# Patient Record
(demographics unavailable — no encounter records)

---

## 2024-10-25 NOTE — PHYSICAL EXAM
[Chaperone Declined] : Patient declined chaperone [[id number]] : A chaperone was present during the pelvic exam. [Appropriately responsive] : appropriately responsive [Alert] : alert [No Acute Distress] : no acute distress [Soft] : soft [Non-tender] : non-tender [Non-distended] : non-distended [Oriented x3] : oriented x3 [Examination Of The Breasts] : a normal appearance [No Masses] : no breast masses were palpable [Labia Majora] : normal [Labia Minora] : normal [Normal] : normal [Uterine Adnexae] : normal

## 2024-10-25 NOTE — HISTORY OF PRESENT ILLNESS
[Normal Amount/Duration] :  normal amount and duration [Frequency: Q ___ days] : menstrual periods occur approximately every [unfilled] days [Never active] : never active [TextBox_31] : never [FreeTextEntry1] : 10/11/24

## 2024-10-25 NOTE — PLAN
[FreeTextEntry1] : 28 y/o G0 LMP 10/11/24 presents to establish care  HCM f/u pap  Urinary symptoms f/u Ua/Ucx  Patient screened for depression- no signs of clinical depression. PHQ-9 scores reviewed over the course of the visit 5-10 minutes of face to face time. Follow up with changes in mood including other symptoms of anxiety